# Patient Record
Sex: FEMALE | Race: WHITE | NOT HISPANIC OR LATINO | Employment: STUDENT | URBAN - METROPOLITAN AREA
[De-identification: names, ages, dates, MRNs, and addresses within clinical notes are randomized per-mention and may not be internally consistent; named-entity substitution may affect disease eponyms.]

---

## 2019-02-21 ENCOUNTER — OFFICE VISIT - HEALTHEAST (OUTPATIENT)
Dept: FAMILY MEDICINE | Facility: CLINIC | Age: 24
End: 2019-02-21

## 2019-02-21 DIAGNOSIS — Z00.00 VISIT FOR PREVENTIVE HEALTH EXAMINATION: ICD-10-CM

## 2019-02-21 DIAGNOSIS — Z11.1 SCREENING-PULMONARY TB: ICD-10-CM

## 2019-02-21 ASSESSMENT — MIFFLIN-ST. JEOR: SCORE: 1250.86

## 2019-02-22 LAB
GAMMA INTERFERON BACKGROUND BLD IA-ACNC: 0.05 IU/ML
M TB IFN-G BLD-IMP: NEGATIVE
MITOGEN IGNF BCKGRD COR BLD-ACNC: -0.01 IU/ML
MITOGEN IGNF BCKGRD COR BLD-ACNC: -0.03 IU/ML
QTF INTERPRETATION: NORMAL
QTF MITOGEN - NIL: >10 IU/ML

## 2019-04-09 ENCOUNTER — COMMUNICATION - HEALTHEAST (OUTPATIENT)
Dept: FAMILY MEDICINE | Facility: CLINIC | Age: 24
End: 2019-04-09

## 2019-10-21 ENCOUNTER — COMMUNICATION - HEALTHEAST (OUTPATIENT)
Dept: FAMILY MEDICINE | Facility: CLINIC | Age: 24
End: 2019-10-21

## 2019-10-23 ENCOUNTER — AMBULATORY - HEALTHEAST (OUTPATIENT)
Dept: FAMILY MEDICINE | Facility: CLINIC | Age: 24
End: 2019-10-23

## 2019-10-23 DIAGNOSIS — Z01.84 IMMUNITY STATUS TESTING: ICD-10-CM

## 2019-11-01 ENCOUNTER — AMBULATORY - HEALTHEAST (OUTPATIENT)
Dept: LAB | Facility: CLINIC | Age: 24
End: 2019-11-01

## 2019-11-01 DIAGNOSIS — Z01.84 IMMUNITY STATUS TESTING: ICD-10-CM

## 2019-11-02 LAB — HBV SURFACE AG SERPL QL IA: NEGATIVE

## 2019-11-04 LAB — HEPATITIS B SURFACE ANTIBODY LHE- HISTORICAL: POSITIVE

## 2019-11-26 ENCOUNTER — COMMUNICATION - HEALTHEAST (OUTPATIENT)
Dept: FAMILY MEDICINE | Facility: CLINIC | Age: 24
End: 2019-11-26

## 2020-03-05 ENCOUNTER — RECORDS - HEALTHEAST (OUTPATIENT)
Dept: ADMINISTRATIVE | Facility: OTHER | Age: 25
End: 2020-03-05

## 2020-05-26 ENCOUNTER — RECORDS - HEALTHEAST (OUTPATIENT)
Dept: ADMINISTRATIVE | Facility: OTHER | Age: 25
End: 2020-05-26

## 2020-06-10 NOTE — PROGRESS NOTES
[facility] Initial Evaluation    Subjective:  Rosita Lima is a 22 year old female with a right foot condition. Symptoms commenced as a result of: getting back into running and using older shoes. Condition occurred in the following environment: NA. Onset of symptoms: May 2020. Location of symptoms: medial ankle and foot. Pain level on number scale: 2-6/10. Quality of pain: sharp, shooting. Associated symptoms: stiffness. Pain frequency (constant/intermittent): intermittent. Symptoms are exacerbated by: walking, running, stairs. Symptoms are relieved by: ice, stretching. Progression of symptoms since onset (same/better/worse): better. Special tests (x-ray, MRI, CT scan, EMG, bone scan): None. Previous treatment: taping, wearing superfeet insert and arch support. Improvement with previous treatment: yes. General health as reported by patient is excellent. Pertinent medical history includes: See Epic. Medical allergies: see Epic. Other pertinent surgeries: see Epic. Current medications: See Epic. Occupation: applying for med school. Patient is (working in normal job without restrictions/working in normal job with restrictions/working in an alternate job/not working due to present treatment problem): NA. Primary job tasks: NA. Barriers at home/work: None reported by patient. Red flags: None reported by patient.    Objective  Gait:  normal    Ankle AROM (* = pain) Right Left   DF WNL WNL   PF WNL WNL   INV WNL* WNL   EV WNL* WNL   Great Toe EXT NT NT   Great Toe FL NT NT     Ankle Strength (* = pain) Right Left   DF 5/5 5/5   PF 5/5 5/5   INV 5-/5* 5/5   EV 5/5 5/5     Palpation Tenderness:  No palpation tenderness    Single leg squat: poor mechanics, weak quad and hip control, anterior knee excursion  SLS, firm, EC:  Right = unsteady    Assessment/Plan:    Patient is a 22 year old female with right side ankle complaints.    Patient has the following significant findings with corresponding  treatment plan.                Diagnosis 1:  Right ankle/foot pain - posterior tibial tendinitis  Pain -  manual therapy, self management, education, directional preference exercise and home program  Decreased strength - therapeutic exercise and therapeutic activities  Impaired balance - neuro re-education and therapeutic activities  Decreased proprioception - neuro re-education and therapeutic activities  Impaired muscle performance - neuro re-education  Decreased function - therapeutic activities    Previous and current functional limitations:  (See Goal Flow Sheet for this information)    Short term and Long term goals: (See Goal Flow Sheet for this information)     Communication ability:  Patient appears to be able to clearly communicate and understand verbal and written communication and follow directions correctly.  Treatment Explanation - The following has been discussed with the patient:   RX ordered/plan of care  Anticipated outcomes  Possible risks and side effects  This patient would benefit from PT intervention to resume normal activities.   Rehab potential is good.    Frequency:  1 X week, once daily  Duration:  for 4 weeks tapering to 2 X a month over 2 months  Discharge Plan:  Achieve all LTG.  Independent in home treatment program.  Reach maximal therapeutic benefit.    Please refer to the daily flowsheet for treatment today, total treatment time and time spent performing 1:1 timed codes.

## 2020-06-11 ENCOUNTER — THERAPY VISIT (OUTPATIENT)
Dept: PHYSICAL THERAPY | Facility: CLINIC | Age: 25
End: 2020-06-11
Payer: COMMERCIAL

## 2020-06-11 DIAGNOSIS — M25.571 ACUTE RIGHT ANKLE PAIN: ICD-10-CM

## 2020-06-11 PROCEDURE — 97161 PT EVAL LOW COMPLEX 20 MIN: CPT | Mod: GP | Performed by: PHYSICAL THERAPIST

## 2020-06-11 PROCEDURE — 97112 NEUROMUSCULAR REEDUCATION: CPT | Mod: GP | Performed by: PHYSICAL THERAPIST

## 2020-06-11 PROCEDURE — 97110 THERAPEUTIC EXERCISES: CPT | Mod: GP | Performed by: PHYSICAL THERAPIST

## 2020-06-11 NOTE — LETTER
[facility] ATHLETIC [facility] PHYSICAL [facility]  [address] [place] [address]  [phone]    2020    Re: Rosita Lima   :   1995  MRN:  [id number]   REFERRING PHYSICIAN:   Louis Jack    [facility] ATHLETIC [facility] PHYSICAL [facility]  Date of Initial Evaluation:  20  Visits:  Rxs Used: 1  Reason for Referral:  Acute right ankle pain    EVALUATION SUMMARY    [facility] Initial [facility]    Subjective:  Rosita Lima is a 22 year old female with a right foot condition. Symptoms commenced as a result of: getting back into running and using older shoes. Condition occurred in the following environment: NA. Onset of symptoms: May 2020. Location of symptoms: medial ankle and foot. Pain level on number scale: 2-6/10. Quality of pain: sharp, shooting. Associated symptoms: stiffness. Pain frequency (constant/intermittent): intermittent. Symptoms are exacerbated by: walking, running, stairs. Symptoms are relieved by: ice, stretching. Progression of symptoms since onset (same/better/worse): better. Special tests (x-ray, MRI, CT scan, EMG, bone scan): None. Previous treatment: taping, wearing superfeet insert and arch support. Improvement with previous treatment: yes. General health as reported by patient is excellent. Pertinent medical history includes: See Epic. Medical allergies: see Epic. Other pertinent surgeries: see Epic. Current medications: See Epic. Occupation: applying for med school. Patient is (working in normal job without restrictions/working in normal job with restrictions/working in an alternate job/not working due to present treatment problem): NA. Primary job tasks: NA. Barriers at home/work: None reported by patient. Red flags: None reported by patient.    Objective  Gait:  normal    Ankle AROM (* = pain) Right Left   DF WNL WNL   PF WNL WNL   INV WNL* WNL   EV WNL* WNL   Great Toe EXT NT NT    Great Toe FL NT NT             Re: Rosita Lima   :   1995      Ankle Strength (* = pain) Right Left   DF 5/5 5/5   PF 5/5 5/   INV 5-/5* 5/5   EV 5//     Palpation Tenderness:  No palpation tenderness    Single leg squat: poor mechanics, weak quad and hip control, anterior knee excursion  SLS, firm, EC:  Right = unsteady    Assessment/Plan:    Patient is a 22 year old female with right side ankle complaints.    Patient has the following significant findings with corresponding treatment plan.                Diagnosis 1:  Right ankle/foot pain - posterior tibial tendinitis  Pain -  manual therapy, self management, education, directional preference exercise and home program  Decreased strength - therapeutic exercise and therapeutic activities  Impaired balance - neuro re-education and therapeutic activities  Decreased proprioception - neuro re-education and therapeutic activities  Impaired muscle performance - neuro re-education  Decreased function - therapeutic activities    Previous and current functional limitations:  (See Goal Flow Sheet for this information)    Short term and Long term goals: (See Goal Flow Sheet for this information)     Communication ability:  Patient appears to be able to clearly communicate and understand verbal and written communication and follow directions correctly.  Treatment Explanation - The following has been discussed with the patient:   RX ordered/plan of care  Anticipated outcomes  Possible risks and side effects  This patient would benefit from PT intervention to resume normal activities.   Rehab potential is good.    Frequency:  1 X week, once daily  Duration:  for 4 weeks tapering to 2 X a month over 2 months  Discharge Plan:  Achieve all LTG.  Independent in home treatment program.  Reach maximal therapeutic benefit.    Please refer to the daily flowsheet for treatment today, total treatment time and time spent performing 1:1 timed codes.     Thank you for  your referral.    INQUIRIES  Therapist: Marcelino Cobb PT   [facility]  [address] [place] [address]  Phone: [phone]  Fax: [phone]

## 2020-06-22 ENCOUNTER — THERAPY VISIT (OUTPATIENT)
Dept: PHYSICAL THERAPY | Facility: CLINIC | Age: 25
End: 2020-06-22
Payer: COMMERCIAL

## 2020-06-22 DIAGNOSIS — M25.571 ACUTE RIGHT ANKLE PAIN: ICD-10-CM

## 2020-06-22 PROCEDURE — 97110 THERAPEUTIC EXERCISES: CPT | Mod: GP | Performed by: PHYSICAL THERAPIST

## 2020-06-22 PROCEDURE — 97112 NEUROMUSCULAR REEDUCATION: CPT | Mod: GP | Performed by: PHYSICAL THERAPIST

## 2020-06-30 ENCOUNTER — RECORDS - HEALTHEAST (OUTPATIENT)
Dept: ADMINISTRATIVE | Facility: OTHER | Age: 25
End: 2020-06-30

## 2021-02-25 PROBLEM — M25.571 ACUTE RIGHT ANKLE PAIN: Status: RESOLVED | Noted: 2020-06-11 | Resolved: 2021-02-25

## 2021-02-25 NOTE — PROGRESS NOTES
Discharge Note    Progress reporting period is from initial evaluation date (please see noted date below) to Jun 22, 2020.  Linked Episodes   Type: Episode: Status: Noted: Resolved: Last update: Updated by:   PHYSICAL THERAPY right foot Active 6/11/2020 6/22/2020  7:59 AM Marcelino Cobb, LEEANN      Comments:       Rosita failed to follow up and current status is unknown.  Please see information below for last relevant information on current status.  Patient seen for 2 visits.    SUBJECTIVE  Subjective changes noted by patient:  Patient reports improving pain symptoms. No tenderness to palpation to posterior tibial tendon. Patient states MD is happy with her progress and only needs to follow up as necessary. Ran 3 separate times last week. [place] runs went well (15 minutes). Longer runs felt a little more fatigue in medial ankle.  .  Current pain level is 0/10.     Previous pain level was  6/10.   Changes in function:  Yes (See Goal flowsheet attached for changes in current functional level)  Adverse reaction to treatment or activity: None    OBJECTIVE  Changes noted in objective findings: right ankle AROM WNL and no pain throughout; right ankle strength WNL throughout; no palpation tenderness; hypomobile right first MTP     ASSESSMENT/PLAN  Diagnosis: right ankle/foot pain - posterior tibial tendinitis   Updated problem list and treatment plan:     STG/LTGs have been met or progress has been made towards goals:  Yes, please see goal flowsheet for most current information  Assessment of Progress: current status is unknown.    Last current status: Pt is progressing as expected   Self Management Plans:  HEP  I have re-evaluated this patient and find that the nature, scope, duration and intensity of the therapy is appropriate for the medical condition of the patient.  Rosita continues to require the following intervention to meet STG and LTG's:  HEP.    Recommendations:  Discharge with current home program.  Patient  to follow up with MD as needed.    Please refer to the daily flowsheet for treatment today, total treatment time and time spent performing 1:1 timed codes.

## 2021-05-27 NOTE — TELEPHONE ENCOUNTER
Left message to call back for: forms  Information to relay to patient:  Please relay message below, ask patient if she would like to  here at the clinic or we can mail to her.

## 2021-05-27 NOTE — TELEPHONE ENCOUNTER
Patient dropped off forms. She would like  to sign them. When completed she would like a call to pick the forms up @ [phone]. She knows it will take 3-5 business days for the forms to be signed.

## 2021-06-02 VITALS — HEIGHT: 63 IN | WEIGHT: 115.25 LBS | BODY MASS INDEX: 20.42 KG/M2

## 2021-06-24 NOTE — PROGRESS NOTES
FEMALE ADULT PREVENTIVE EXAM    CHIEF COMPLAINT:  Female preventive exam.    SUBJECTIVE:  Rosita Lima is a 23 y.o. female who presents for her routine physical exam.    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  Denies any cough, night sweats hemoptysis or weight loss.  Has not had a screening Pap smear, but will schedule a separate appointment for that.      GYNE HISTORY  Menses: yes  Sexually Active: yes both  Contraception: condom  Last Pap: none  Abnormal Pap: no  Vaginal Discharge: no      She  has no past medical history on file.    No results found for: WBC, HGB, HCT, MCV, PLT, NA, K, BUN  No results found for: CHOL, HDL, LDLCALC, TRIG  No results found for: TSH  BP Readings from Last 3 Encounters:   02/21/19 110/70       Surgeries:  No past surgical history on file.    Family History:  Her family history is not on file.    Social History:  She  reports that  has never smoked. she has never used smokeless tobacco. She reports that she drinks alcohol. She reports that she does not use drugs.    Medications:  No current outpatient medications on file.  HELD MEDICATIONS: None.    Allergies:  No latex allergies.  No Known Allergies         RISK BEHAVIOR & HEALTH HABITS  Self Breast Exam: yes.  Regular Exercise: yes.  Balanced diet: yes.  Seat Belt Use: yes  Calcium intake/Osteoporosis prevention: yes.    Guns: NA  Sun Screen: YES  Dental Care: YES    REVIEW OF SYSTEMS:  Complete head to toe review of systems is otherwise negative except as above.    OBJECTIVE:  VITAL SIGNS:    Vitals:    02/21/19 0847   BP: 110/70   Pulse: 68   SpO2: 100%     GENERAL:  Patient alert, in no acute distress.  EYES: PERRLA. Extraoccular movements intact, pupils equal, reactive to light and accommodation.  Normal conjunctiva and lids.    ENT:  Hearing grossly  normal.  Normal appearance to ears and nose.  Bilateral TM s, external canals, oropharynx normal. Normal lips, gums and teeth.    NECK:  Supple, without thyromegaly or mass, no adenopathies.  RESP:  Clear to auscultation without crackles, wheezes or distress.  Normal respiratory effort.   CV:  Regular rate and rhythm without murmurs, rubs or gallops.  Normal pedal pulses.  No varicosities or edema.  ABDOMEN:  Soft, non-tender, without hepatosplenomegaly, masses, or hernias.   BREASTS:  declined  :  Rectal: declined  LYMPHATIC: Normal palpation of neck.  No lymphadenopathy.  No bruising.  NEURO:  CN II-XII intact, motor & sensory function all intact.  DTR and reflexes normal.  PSYCHIATRIC:  Alert & oriented with normal mood and affect.  Good judgment and insight.  SKIN:  Normal inspection and palpation.  MUSCULOSKELETAL: Normal gait and station.  - Spine / Ribs / Pelvis: Normal inspection, ROM, stability and strength: Spine, Head, Neck, Upper and Lower Extremities.    ASSESSMENT & PLAN  Rosita was seen today for establish care, annual exam and injections.    Diagnoses and all orders for this visit:    Visit for preventive health examination    Screening-pulmonary TB  -     QTF-Mycobacterium tuberculosis by QuantiFERON-TB Gold Plus    Other orders  -     Influenza, Seasonal Quad, Preservative Free 36+ Months  -     Tdap vaccine,  8yo or older,  IM      General  Immunizations reviewed and updated .  Alcohol use, safety and moderation discussed.  Recommended adequate calcium, vitamin D intake/osteoporosis prevention.  Discussed colon cancer screening at age 50, 45 if -American.  Diet and exercise reviewed, including goal of aerobic exercise 30-90 minutes most days of the week, moderation of portions sizes, avoiding eating out and fast food and increase in fruits and vegetables.  Discussed safe sex practices.    Discussed & recommended seat belt (& motorcycle helmet) use.  Discussed & recommended smoke  detector.  Discussed sun protection.  Discussed weight management.  Discussed self breast exam, screening mammogram timing.  The following are part of a depression follow up plan for the patient:  coping support assessment and coping support management    Ruben Mccracken MD

## 2021-08-15 ENCOUNTER — HEALTH MAINTENANCE LETTER (OUTPATIENT)
Age: 26
End: 2021-08-15

## 2021-10-11 ENCOUNTER — HEALTH MAINTENANCE LETTER (OUTPATIENT)
Age: 26
End: 2021-10-11

## 2022-09-25 ENCOUNTER — HEALTH MAINTENANCE LETTER (OUTPATIENT)
Age: 27
End: 2022-09-25

## 2023-10-14 ENCOUNTER — HEALTH MAINTENANCE LETTER (OUTPATIENT)
Age: 28
End: 2023-10-14